# Patient Record
Sex: FEMALE | Race: WHITE | Employment: OTHER | URBAN - METROPOLITAN AREA
[De-identification: names, ages, dates, MRNs, and addresses within clinical notes are randomized per-mention and may not be internally consistent; named-entity substitution may affect disease eponyms.]

---

## 2023-05-17 ENCOUNTER — HOSPITAL ENCOUNTER (EMERGENCY)
Facility: HOSPITAL | Age: 83
Discharge: HOME | End: 2023-05-17
Attending: STUDENT IN AN ORGANIZED HEALTH CARE EDUCATION/TRAINING PROGRAM
Payer: MEDICARE

## 2023-05-17 VITALS
DIASTOLIC BLOOD PRESSURE: 85 MMHG | SYSTOLIC BLOOD PRESSURE: 145 MMHG | RESPIRATION RATE: 20 BRPM | TEMPERATURE: 98.4 F | OXYGEN SATURATION: 97 % | HEART RATE: 87 BPM

## 2023-05-17 DIAGNOSIS — E11.9 NEW ONSET TYPE 2 DIABETES MELLITUS (HCC): Primary | ICD-10-CM

## 2023-05-17 LAB
ALBUMIN SERPL-MCNC: 4 G/DL (ref 3.5–5)
ALBUMIN/GLOB SERPL: 1.1 (ref 1.1–2.2)
ALP SERPL-CCNC: 80 U/L (ref 45–117)
ALT SERPL-CCNC: 36 U/L (ref 12–78)
ANION GAP SERPL CALC-SCNC: 5 MMOL/L (ref 5–15)
AST SERPL-CCNC: 22 U/L (ref 15–37)
BASOPHILS # BLD: 0.1 K/UL (ref 0–0.1)
BASOPHILS NFR BLD: 1 % (ref 0–1)
BILIRUB SERPL-MCNC: 0.6 MG/DL (ref 0.2–1)
BUN SERPL-MCNC: 23 MG/DL (ref 6–20)
BUN/CREAT SERPL: 19 (ref 12–20)
CALCIUM SERPL-MCNC: 9.2 MG/DL (ref 8.5–10.1)
CHLORIDE SERPL-SCNC: 102 MMOL/L (ref 97–108)
CO2 SERPL-SCNC: 25 MMOL/L (ref 21–32)
COMMENT:: NORMAL
CREAT SERPL-MCNC: 1.18 MG/DL (ref 0.55–1.02)
DIFFERENTIAL METHOD BLD: NORMAL
EOSINOPHIL # BLD: 0.3 K/UL (ref 0–0.4)
EOSINOPHIL NFR BLD: 3 % (ref 0–7)
ERYTHROCYTE [DISTWIDTH] IN BLOOD BY AUTOMATED COUNT: 12.2 % (ref 11.5–14.5)
EST. AVERAGE GLUCOSE BLD GHB EST-MCNC: 315 MG/DL
GLOBULIN SER CALC-MCNC: 3.7 G/DL (ref 2–4)
GLUCOSE BLD STRIP.AUTO-MCNC: 289 MG/DL (ref 65–117)
GLUCOSE BLD STRIP.AUTO-MCNC: 311 MG/DL (ref 65–117)
GLUCOSE SERPL-MCNC: 300 MG/DL (ref 65–100)
HBA1C MFR BLD: 12.6 % (ref 4–5.6)
HCT VFR BLD AUTO: 43 % (ref 35–47)
HGB BLD-MCNC: 14 G/DL (ref 11.5–16)
IMM GRANULOCYTES # BLD AUTO: 0 K/UL (ref 0–0.04)
IMM GRANULOCYTES NFR BLD AUTO: 0 % (ref 0–0.5)
LYMPHOCYTES # BLD: 2.5 K/UL (ref 0.8–3.5)
LYMPHOCYTES NFR BLD: 26 % (ref 12–49)
MCH RBC QN AUTO: 32 PG (ref 26–34)
MCHC RBC AUTO-ENTMCNC: 32.6 G/DL (ref 30–36.5)
MCV RBC AUTO: 98.4 FL (ref 80–99)
MONOCYTES # BLD: 1 K/UL (ref 0–1)
MONOCYTES NFR BLD: 10 % (ref 5–13)
NEUTS SEG # BLD: 5.7 K/UL (ref 1.8–8)
NEUTS SEG NFR BLD: 60 % (ref 32–75)
NRBC # BLD: 0 K/UL (ref 0–0.01)
NRBC BLD-RTO: 0 PER 100 WBC
PLATELET # BLD AUTO: 247 K/UL (ref 150–400)
PMV BLD AUTO: 10.6 FL (ref 8.9–12.9)
POTASSIUM SERPL-SCNC: 4.4 MMOL/L (ref 3.5–5.1)
PROT SERPL-MCNC: 7.7 G/DL (ref 6.4–8.2)
RBC # BLD AUTO: 4.37 M/UL (ref 3.8–5.2)
SERVICE CMNT-IMP: ABNORMAL
SERVICE CMNT-IMP: ABNORMAL
SODIUM SERPL-SCNC: 132 MMOL/L (ref 136–145)
SPECIMEN HOLD: NORMAL
WBC # BLD AUTO: 9.6 K/UL (ref 3.6–11)

## 2023-05-17 PROCEDURE — 6370000000 HC RX 637 (ALT 250 FOR IP): Performed by: STUDENT IN AN ORGANIZED HEALTH CARE EDUCATION/TRAINING PROGRAM

## 2023-05-17 PROCEDURE — 83036 HEMOGLOBIN GLYCOSYLATED A1C: CPT

## 2023-05-17 PROCEDURE — 85025 COMPLETE CBC W/AUTO DIFF WBC: CPT

## 2023-05-17 PROCEDURE — 82962 GLUCOSE BLOOD TEST: CPT

## 2023-05-17 PROCEDURE — 80053 COMPREHEN METABOLIC PANEL: CPT

## 2023-05-17 PROCEDURE — 36415 COLL VENOUS BLD VENIPUNCTURE: CPT

## 2023-05-17 RX ORDER — MONTELUKAST SODIUM 10 MG/1
TABLET ORAL
COMMUNITY
Start: 2023-04-19

## 2023-05-17 RX ORDER — BLOOD-GLUCOSE METER
1 KIT MISCELLANEOUS DAILY
Qty: 1 KIT | Refills: 0 | Status: SHIPPED | OUTPATIENT
Start: 2023-05-17

## 2023-05-17 RX ORDER — METOPROLOL SUCCINATE 100 MG/1
TABLET, EXTENDED RELEASE ORAL
COMMUNITY
Start: 2023-03-20

## 2023-05-17 RX ORDER — CLOPIDOGREL BISULFATE 75 MG/1
TABLET ORAL
COMMUNITY
Start: 2023-03-30

## 2023-05-17 RX ORDER — ESCITALOPRAM OXALATE 20 MG/1
TABLET ORAL
COMMUNITY
Start: 2023-04-21

## 2023-05-17 RX ORDER — AMLODIPINE BESYLATE AND BENAZEPRIL HYDROCHLORIDE 10; 40 MG/1; MG/1
CAPSULE ORAL
COMMUNITY
Start: 2023-03-13

## 2023-05-17 RX ORDER — LORAZEPAM 2 MG/1
TABLET ORAL
COMMUNITY
Start: 2023-02-13

## 2023-05-17 RX ORDER — INSULIN GLARGINE 100 [IU]/ML
10 INJECTION, SOLUTION SUBCUTANEOUS ONCE
Status: COMPLETED | OUTPATIENT
Start: 2023-05-17 | End: 2023-05-17

## 2023-05-17 RX ORDER — GABAPENTIN 300 MG/1
CAPSULE ORAL
COMMUNITY
Start: 2023-02-13

## 2023-05-17 RX ORDER — ROSUVASTATIN CALCIUM 10 MG/1
TABLET, COATED ORAL
COMMUNITY
Start: 2023-04-21

## 2023-05-17 RX ADMIN — INSULIN GLARGINE 10 UNITS: 100 INJECTION, SOLUTION SUBCUTANEOUS at 22:45

## 2023-05-17 ASSESSMENT — PAIN - FUNCTIONAL ASSESSMENT: PAIN_FUNCTIONAL_ASSESSMENT: NONE - DENIES PAIN

## 2023-05-17 NOTE — ED TRIAGE NOTES
Pt referred by PCP for hyperglycemia. Pt reports she received a call from provider saying her glucose was in the 500's. Pt denies being diabetic.

## 2023-05-18 NOTE — ED PROVIDER NOTES
normal axis, no acute ST segment or T wave changes. Interpreted independently by me. Laboratory Results: If not already interpreted as below in ED course, I have personally ordered, reviewed, and independently interpreted all laboratory results, notable for:  --  Labs Reviewed   COMPREHENSIVE METABOLIC PANEL - Abnormal; Notable for the following components:       Result Value    Sodium 132 (*)     Glucose 300 (*)     BUN 23 (*)     Creatinine 1.18 (*)     Est, Glom Filt Rate 47 (*)     All other components within normal limits   POCT GLUCOSE - Abnormal; Notable for the following components:    POC Glucose 311 (*)     All other components within normal limits   CBC WITH AUTO DIFFERENTIAL   EXTRA TUBES HOLD   HEMOGLOBIN A1C     Imaging Results: If not already interpreted as below in ED course, I have personally ordered, reviewed, and interpreted the following radiology results:    No orders to display       ED COURSE        PROCEDURES  Procedures    Medications Ordered/Administered in ED  Medications - No data to display    ADDITIONAL CONSIDERATIONS   I opted against but considered ordering the following:  --CT  -- ***  Additional relevant contributory comorbidities managed:  ***  Social Determinants of Health addressed:  ***  FINAL ASSESSMENT AND DISPOSITION     ED DIAGNOSIS  1.  New onset type 2 diabetes mellitus (HCC)        DISPOSITION  ***    Labs/Imaging Studies Ordered/Performed in ED  Orders Placed This Encounter   Procedures    CBC with Auto Differential    Comprehensive Metabolic Panel    Extra Tubes Hold    Hemoglobin A1C    POC GLUCOSE    POCT Glucose       Electronically signed by

## 2023-06-29 ENCOUNTER — NURSE ONLY (OUTPATIENT)
Age: 83
End: 2023-06-29
Payer: MEDICARE

## 2023-06-29 DIAGNOSIS — E11.65 TYPE 2 DIABETES MELLITUS WITH HYPERGLYCEMIA, WITHOUT LONG-TERM CURRENT USE OF INSULIN (HCC): Primary | ICD-10-CM

## 2023-06-29 PROCEDURE — G0108 DIAB MANAGE TRN  PER INDIV: HCPCS

## 2023-07-26 ENCOUNTER — NURSE ONLY (OUTPATIENT)
Age: 83
End: 2023-07-26
Payer: MEDICARE

## 2023-07-26 DIAGNOSIS — E11.65 TYPE 2 DIABETES MELLITUS WITH HYPERGLYCEMIA, WITHOUT LONG-TERM CURRENT USE OF INSULIN (HCC): Primary | ICD-10-CM

## 2023-07-26 PROCEDURE — G0108 DIAB MANAGE TRN  PER INDIV: HCPCS

## 2023-07-26 NOTE — PROGRESS NOTES
[facility] Program for Diabetes Health  Diabetes Self-Management Education & Support Program  Encounter Note      SUMMARY  Diabetes self-care management training was completed related to healthy eating. he participant will return on August 09 to continue DSMES related to monitoring. The participant did identify SMART Goal(s) and will practice knowledge and skills related to healthy eating to improve diabetes self-management. EVALUATION:  Blood sugars= states blood sugars are  mg/dl   Weight= states she can't use the scaled because she looses her balance. Pt with hx of troke. Healthy eating = I taught briefly the CHO counting on previous visit and the patient ,started to count and now. her bs are in better control 98-10 , they used to be 200- 300 mg/dl . Patient able to demonstrate correctly how to count CHO's. Congratulated patient for great effort to control her blood sugars. RECOMMENDATIONS:  - Continue with lifestyle changes     TOPICS DISCUSSED TODAY:  WHAT CAN I EAT? 60      Next provider visit is scheduled: not schedule at this time. SMART GOAL(S)   Will count CHO for each meal 7 days a week. ACHIEVEMENT OF GOAL(S) : 0-24%         DATE DSMES TOPIC EVALUATION     7/26/2023 WHAT CAN I EAT? General principles   Determining a healthy weight   Nutritional terms & tools   Healthy Plate method   Carbohydrate Counting   Reading food labels   Free apps   Pregnancy recommendations      The participant   Uses Healthy Plate principles in constructing meals: Yes  Reads food labels in choosing acceptable foods: Yes    The participant needs to address: will practice CHO counting. Felix Cardoso RN on 7/26/2023 at 2:27 PM    I have personally reviewed the health record, including provider notes, laboratory data and current medications before making these care and education recommendations. The time spent in this effort is included in the total time.   Total minutes: [address]

## 2023-08-09 ENCOUNTER — NURSE ONLY (OUTPATIENT)
Age: 83
End: 2023-08-09

## 2023-08-09 DIAGNOSIS — E11.65 TYPE 2 DIABETES MELLITUS WITH HYPERGLYCEMIA, WITHOUT LONG-TERM CURRENT USE OF INSULIN (HCC): Primary | ICD-10-CM

## 2023-08-09 NOTE — PROGRESS NOTES
[facility] Program for Diabetes Health  Diabetes Self-Management Education & Support Program  Encounter Note      SUMMARY  Diabetes self-care management training was completed related to monitoring. he participant will return on August 24 to continue DSMES related to reducing risks. The participant did identify SMART Goal(s) and will practice knowledge and skills related to reducing risks to improve diabetes self-management. EVALUATION:  Blood sugars= states his bs today 125 mg/dl . States bs in the past couple of weeks were 125, 130 . States learned what Is causing her blood sugars goes up , like stress and working on it . RECOMMENDATIONS:  - continue with lifestyle changes. TOPICS DISCUSSED TODAY:  HOW CAN BLOOD GLUCOSE MONITORING HELP ME? 61      Next provider visit is scheduled: not booked at this time      SMART GOAL(S)   Will purchase control solution and check test strips. ACHIEVEMENT OF GOAL(S) : 0-24%             DATE DSMES TOPIC EVALUATION     8/9/2023 HOW CAN BLOOD GLUCOSE MONITORING HELP ME? Value of blood glucose monitoring   Realistic expectations   Blood glucose monitoring targets   Target adjustments   Setting a1c & blood glucose targets with provider   Meter selection    Technique for obtaining blood droplet   Blood glucose testing sites   Determining best times to test   Pregnancy recommendations   Data sharing with provider        The participant   Can demonstrate their glucometer procedure: Yes  Logs their BG readings:  Yes    The participant needs to address :  check test strips with control solution. Shankar Sr RN on 8/9/2023 at 2:43 PM    I have personally reviewed the health record, including provider notes, laboratory data and current medications before making these care and education recommendations. The time spent in this effort is included in the total time. Total minutes: 60      Louise Gonzales, was evaluated in person at office visit.      Shankar Rosenthal

## 2023-08-24 ENCOUNTER — NURSE ONLY (OUTPATIENT)
Age: 83
End: 2023-08-24
Payer: MEDICARE

## 2023-08-24 DIAGNOSIS — E11.65 TYPE 2 DIABETES MELLITUS WITH HYPERGLYCEMIA, WITHOUT LONG-TERM CURRENT USE OF INSULIN (HCC): Primary | ICD-10-CM

## 2023-08-24 PROCEDURE — G0108 DIAB MANAGE TRN  PER INDIV: HCPCS

## 2023-08-24 NOTE — PROGRESS NOTES
[facility] Program for Diabetes Health  Diabetes Self-Management Education & Support Program  Encounter Note      SUMMARY  Diabetes self-care management training was completed related to reducing risks. he participant will return on September 01 to continue DSMES related to reducing risks. The participant did not identify SMART Goal(s)     EVALUATION:  Blood Sugars= states bs = mg/dl, states bs= 122 mg/dl today   Exercise= doing house work, walks when [employer], doesn't have a routine for exercise. RECOMMENDATIONS:  - continue with lifestyle changes. TOPICS DISCUSSED TODAY:  WHAT IS DIABETES? Minutes: 60      Next provider visit is scheduled = not booked at this time. SMART GOAL(S)   Patient doesn't have a smart goal at this time. DATE DSMES TOPIC EVALUATION     8/24/2023 WHAT IS DIABETES? Role of the normal pancreas in energy balance and blood glucose control   The defect seen in diabetes   Signs & symptoms of diabetes   Diagnosis of diabetes   Types of diabetes   Blood glucose targets in non-pregnant & non-pregnant adults       The participant knows  Their type of diabetes: Yes   The basic physiologic defect: Yes  Blood glucose targets: Yes       Michael Johnston RN on 8/24/2023 at 2:40 PM    I have personally reviewed the health record, including provider notes, laboratory data and current medications before making these care and education recommendations. The time spent in this effort is included in the total time. Total minutes: 60    Liset Encarnacion, was evaluated in person at office visit.      Michael Johnston RN [address] [facility]  Certified Diabetes and    [facility] for Diabetes [facility]   Tel  [phone]

## 2023-09-01 ENCOUNTER — NURSE ONLY (OUTPATIENT)
Age: 83
End: 2023-09-01

## 2023-09-01 DIAGNOSIS — E11.65 TYPE 2 DIABETES MELLITUS WITH HYPERGLYCEMIA, WITHOUT LONG-TERM CURRENT USE OF INSULIN (HCC): Primary | ICD-10-CM

## 2023-09-01 NOTE — PROGRESS NOTES
[facility] Program for Diabetes Health  Diabetes Self-Management Education & Support Program  Encounter Note      SUMMARY  Diabetes self-care management training was completed related to reducing risks. he participant will return on September 15 to continue DSMES related to taking medications. The participant did not identify SMART Goal(s)     EVALUATION:  - Blood sugars = 98- 137 mg/dl. - vaccines: patients states she doesn't take the flu vaccine , or pneumonia vaccines  - Eye exam - states had exam on 8/22   - she check her blood pressure at home , using the wrist meter , states her pcp advised not to use that one . States can't use regular blood pressure cuff by herself, hx of stroke. I show her  a picture of a BP cuff that just need to place her arm in , no need to wrap around the arm. States have a neighbor who is an MD and she will help her get one.  - Stress: states she takes medication for stress, \"atarax\"   She also do deep breathing for stress. RECOMMENDATIONS:  - continue with lifestyle changes. TOPICS DISCUSSED TODAY:  HOW DO I STAY HEALTHY? 61      Next provider visit is scheduled : not schedule at this time       SMART GOAL(S)   Patient doesn't have a smart goal at this time. DATE DSMES TOPIC EVALUATION     9/1/2023 HOW DO I STAY HEALTHY? Prevention   Vaccinations   Preconception care (if applicable)  Examinations   Eye    Foot   Diabetic complications' prevention   Dental health   Heart health   Kidney Health   Nerve health   Sleep health      The participant has a personal diabetes care record to keep abreast of diabetes health Yes     The participant needs to address : fill the diabetes care record. Cailin Herrmann RN on 9/1/2023 at 2:29 PM    I have personally reviewed the health record, including provider notes, laboratory data and current medications before making these care and education recommendations.  The time spent in this effort is included in the total

## 2023-09-15 ENCOUNTER — OFFICE VISIT (OUTPATIENT)
Age: 83
End: 2023-09-15

## 2023-09-15 DIAGNOSIS — E11.65 TYPE 2 DIABETES MELLITUS WITH HYPERGLYCEMIA, WITHOUT LONG-TERM CURRENT USE OF INSULIN (HCC): Primary | ICD-10-CM

## 2023-09-15 NOTE — PROGRESS NOTES
[employer] [facility] Program for Diabetes Health  Diabetes Self-Management Education & Support Program  Encounter Note      SUMMARY  Diabetes self-care management training was completed related to taking medications. he participant will return on September 25 to continue DSMES related to taking medications. The participant did not identify SMART Goal(s) . EVALUATION:  - blood sugars = today the bs was 117 mg/dl  - states have an issue and came late today. I LMTRC , informed to disregard. RECOMMENDATIONS:  - continue with lifestyle changes. TOPICS DISCUSSED TODAY:  HOW DO MY DIABETES MEDICATIONS WORK? 30      Next provider visit is scheduled: not schedule        SMART GOAL(S)   Patient don't have smart goal at this time. DATE DSMES TOPIC EVALUATION     9/15/2023 HOW DO MY DIABETES MEDICATIONS WORK? Type 2 medications   Oral agents   GLP-1 agonists      The participant   Can describe the expected action & side effects of prescribed diabetes medications: Yes      The participant needs to address : report any adverse effects. Fabio Barragan RN on 9/15/2023 at 1:36 PM    I have personally reviewed the health record, including provider notes, laboratory data and current medications before making these care and education recommendations. The time spent in this effort is included in the total time. Total minutes: 30    Patient was late 30 minutes . Hx stroke      Faheber Salinas, was evaluated at office visit.      Fabio Barragan RN [address] [facility]  Certified Diabetes and    [facility] Program for Diabetes Health   Tel  [phone]

## 2023-09-25 ENCOUNTER — OFFICE VISIT (OUTPATIENT)
Age: 83
End: 2023-09-25
Payer: MEDICARE

## 2023-09-25 DIAGNOSIS — E11.65 TYPE 2 DIABETES MELLITUS WITH HYPERGLYCEMIA, WITHOUT LONG-TERM CURRENT USE OF INSULIN (HCC): Primary | ICD-10-CM

## 2023-09-25 PROCEDURE — G0108 DIAB MANAGE TRN  PER INDIV: HCPCS

## 2023-09-25 NOTE — PROGRESS NOTES
making these care and education recommendations. The time spent in this effort is included in the total time. Total minutes: 30      Juan Marques, was evaluated in person at office visit.      Natalie Vazquez RN [address] [facility]  Certified Diabetes and    [facility] Program for Diabetes Health   Tel  [phone]

## 2023-11-16 ENCOUNTER — TELEPHONE (OUTPATIENT)
Age: 83
End: 2023-11-16

## 2023-11-16 NOTE — TELEPHONE ENCOUNTER
[address] [facility]  TELEPHONE CALL   11/16/23  1:20 PM    I called patient Ricky Sheppard , today . Notice patient cancelled several appointments . States she have shingles and she it's been unable to come to the appointments. She wants to re-schedule for Jan- 2024 . Appt re-scheduled for 1/3/24. sridevi Salcido RN [address] [facility]  Certified Diabetes and    [facility] Program for Diabetes Health   Tel  [phone]      Note routed to PCP and Endocrinologist , for FYI   Copy of message sent     [facility]     Patient will resume , DM classes in [place] 3, 2024. States she have shingles.       Thank you,   Luna Dockery RN [address] [facility]  Certified Diabetes and    [facility] Program for Diabetes Health \"

## 2024-01-03 ENCOUNTER — OFFICE VISIT (OUTPATIENT)
Age: 84
End: 2024-01-03
Payer: MEDICARE

## 2024-01-03 DIAGNOSIS — E11.65 TYPE 2 DIABETES MELLITUS WITH HYPERGLYCEMIA, WITHOUT LONG-TERM CURRENT USE OF INSULIN (HCC): Primary | ICD-10-CM

## 2024-01-03 PROCEDURE — G0108 DIAB MANAGE TRN  PER INDIV: HCPCS

## 2024-01-03 NOTE — PROGRESS NOTES
[facility] for Diabetes Health  Diabetes Self-Management Education & Support Program  Encounter Note      SUMMARY  Diabetes self-care management training was completed related to physical activity. he participant will return on January 11 to continue DSMES related to healthy coping. The participant did identify SMART Goal(s) and will practice knowledge and skills related to being active to improve diabetes self-management.        EVALUATION:  Blood Sugars= states today bs = 91 mg/dl . States Saturday her bs was 190 mg/dl , states she had high stress , lost her dog .    States she its been having issues during the holidays; was expose to covid with a relative but was not positive for covid; also lost her dog; prior had shingles.     Exercise= states she walk 5 mnts every day and she is very active at home, doing house work.   Advised to count her steps , educated on device and gave her handout that can covert steps in minutes.   States will consult with provider or relative that is a doctor on Physical therapy on what exercises can do. Advised to consult with her provider prior any changes on exercise. Pt may benefit from PT to teach her again exercise she can do at home. Patient states they didn't gave her exercises that she can do at home.   States lost 20 lbs. States she is counting CHO's. Pt also taking mounjaro.     RECOMMENDATIONS:  - Continue with lifestyle changes.      TOPICS DISCUSSED TODAY:  HOW DOES PHYSICAL ACTIVITY AFFECT MY DIABETES? 60      Next provider visit is scheduled : not booked at this time       SMART GOAL(S)   States will asked her provider , or a relative that is a doctor on Physical therapy , to help her with exercises that she can do.  Advised to consult with provider for any changes in exercise.   ACHIEVEMENT OF GOAL(S) : 0-24%         DATE DSMES TOPIC EVALUATION     1/3/2024 HOW DOES PHYSICAL ACTIVITY AFFECT MY DIABETES?   Benefits of physical activity   Beginning a program of

## 2024-01-11 ENCOUNTER — OFFICE VISIT (OUTPATIENT)
Age: 84
End: 2024-01-11

## 2024-01-11 DIAGNOSIS — E11.65 TYPE 2 DIABETES MELLITUS WITH HYPERGLYCEMIA, WITHOUT LONG-TERM CURRENT USE OF INSULIN (HCC): Primary | ICD-10-CM

## 2024-01-11 NOTE — PROGRESS NOTES
[facility] for Diabetes Health  Diabetes Self-Management Education & Support Program  Encounter Note      SUMMARY  Diabetes self-care management training was completed related to healthy coping. he participant will return on January 18 to continue DSMES related to problem solving. The participant did identify SMART Goal(s) and will practice knowledge and skills related to healthy eating and monitoring to improve diabetes self-management.        EVALUATION:  Blood sugars= states she had a bs today of 85 mg/dl , and yesterday bs of 85 mg/dl.  States when she had the 85 mg/dl, she felt light headache when she got up.   Other bs #'s= 115 mg/dl, 116 mg/dl, 106 mg/dl.   States her dog pass away and she was very stress out about him , he was sick. She got up at night several times. Now she is sleeping all night , she feels rest and less stress. Think this is the reason why her bs are even lower than usual.     Healthy eating= states since she started the classes she is eating 3 meals a day. She is also eating 3 apples per day.  Haven't purchase her apples this week. Usually eats \"pink apples\"    Stress = states is lower since she doesn't have her worry about her dog. Planning to get a new dog. Pt very happy about it. States she do deep breathing . She used to play the piano. Some limitations walking due to her hx of stroke.     Exercise= states she do house work, not walking on regular basis , due to weather. She try to be active.   States lost 22 lbs since November. Patient on mounjaro.     RECOMMENDATIONS:  - continue with lifestyle changes  - notify her PCP about her bs on the 80's in am   - will eat her usual snacks    TOPICS DISCUSSED TODAY:  HOW DO I FIND SUPPORT TO TACKLE THIS CONDITION? 60      Next provider visit is scheduled : not scheduled at this time per chart.        SMART GOAL(S)   Will contact provider to report her bs   ACHIEVEMENT OF GOAL(S) : 0-24%    2.    Will resume her usual

## 2024-01-18 ENCOUNTER — OFFICE VISIT (OUTPATIENT)
Age: 84
End: 2024-01-18

## 2024-01-18 DIAGNOSIS — E11.65 TYPE 2 DIABETES MELLITUS WITH HYPERGLYCEMIA, WITHOUT LONG-TERM CURRENT USE OF INSULIN (HCC): Primary | ICD-10-CM

## 2024-01-18 NOTE — PROGRESS NOTES
[facility] Diabetes [facility]  Diabetes Self-Management Education & Support Program  Encounter Note      SUMMARY  Diabetes self-care management training was completed related to problem solving. he participant will return on February 29 for the 6 weeks follow up  The participant did identify SMART Goal(s) and will practice knowledge and skills related to problem solving to improve diabetes self-management.        EVALUATION:  Blood sugars= states her bs this morning was 77 mg/dl , then she re-check in 5 minutes and her bs was 117 mg/dl. States her bs last night was 120 mg/dl . Denies any sx . States she changed the battery of her meter recently. Have a True Metrix meter. She got her meter on May 2023 .   Was using only one finger to check her blood sugars - reviewed again how to check blood sugars. Will use other fingers.   On last visit bs in am was on the 80's , she states she called and notified her provider and told her that was ok . No changes on medication. Advised to call again since blood sugars on the 70's , she will also check her test strips with control solution.     Stress= levels of stress down , since her dog \"peppa\" pass away. She used get up at night frequently since he was sick,but now she is sleeping better.     Healthy eating = states she think at times may not eating enough.  Breakfast= cheerios and water  Lunch= Bread - 2 pcs ; beef , zucchini, spinach, onions, apple   Dinner= chicken , broccoli, fettuccini nancie, water   Snack= apple     Meter= will check test strips with control solution. Reviewed how to check test strips with control solution.     RECOMMENDATIONS:  - continue with lifestyle changes  - report to her provider if her blood sugars are on the 70's or 80's again. Patient with good feedback.      TOPICS DISCUSSED TODAY:  HOW DO I FIGURE OUT WHAT'S INFLUENCING MY BLOOD GLUCOSES? 60      Next provider visit is scheduled : not book at this time per chart        SMART

## 2024-01-19 ENCOUNTER — TELEPHONE (OUTPATIENT)
Age: 84
End: 2024-01-19

## 2024-01-19 NOTE — TELEPHONE ENCOUNTER
PROGRAM FOR DIABETES HEALTH  TELEPHONE CALL   01/19/24  4:55 PM    I called patient Cici Thorne , today 01/19/24, to follow up with her , however not accepting calls. I called twice today    Will try to call Monday again.   Unable to contact.    02/15/24  3:08 PM    Patient states she did contact her provider. He gave her parameters for he blood sugars. States her bs are 112-116 mg/dl  in am . Blood sugar today in AM= 120 mg/dl. States she think bs was going lower on the 80's due to the trauma of losing her dog. Patient just got a new puppy and states feels great.      Advised to call PRN   Patient have an appt with me on 2/29/24.       Daily Sal[facility]ido [facility]  Certified Diabetes and    [facility] for Diabetes Health   Tel  [phone]

## 2024-04-01 ENCOUNTER — OFFICE VISIT (OUTPATIENT)
Age: 84
End: 2024-04-01
Payer: MEDICARE

## 2024-04-01 DIAGNOSIS — E11.65 TYPE 2 DIABETES MELLITUS WITH HYPERGLYCEMIA, WITHOUT LONG-TERM CURRENT USE OF INSULIN (HCC): Primary | ICD-10-CM

## 2024-04-01 PROCEDURE — G0108 DIAB MANAGE TRN  PER INDIV: HCPCS

## 2024-04-01 NOTE — PROGRESS NOTES
[facility] for Diabetes Health  Diabetes Self-Management Education & Support Program  Post-program Evaluation    EVALUATION @ COMPLETION OF THE PROGRAM    Cici Thorne completed 8 hours of diabetes self-management education. The participant acquired new knowledge and demonstrated new skills during the program.     The participant worked on the following SMART GOAL(s) to improve their diabetes self-management:  Previous goals:   Will buy glucose tablets to carry with her and have in the house, car.   ACHIEVEMENT OF GOAL(S) : 100%     2.    Will call her provider to notifed the bs of 77 mg/dl. And also if she gets bs of 70's, 80's.   ACHIEVEMENT OF GOAL(S) :  100 %   states told her provider and was told to keep bs 100-150 mg/dl   Blood sugars are better , states bs on 70's once, 80's 2x , 90's 1x, other bs numbers 100's.      The participant's pre-program A1c was   Lab Results   Component Value Date/Time    LABA1C 12.6 05/17/2023 06:13 PM   . The post-evaluation A1c : not done at this time.    The participant improved their Diabetes Skills, Confidence and Preparedness Index (scored out of 7):    Total score: 6.5  Skills: 6.4  Confidence: 4.7  Preparedness: 5.5    NOTE:   - patient states she learned a lot  on how to manage her diabetes.  States since following the plate method blood sugars had a great improvement.   - blood sugars = reports today bs = 114 mg/dl today.   - states getting her new puppy, is a great stress relief.   - states with her previous dog , she had to get up at night to take care of him and she was suffering from lack of sleep. States she is sleeping better now.     FINAL RECOMMENDATIONS:  - continue with lifestyle changes.      Next provider visit is scheduled = not booked a this time.        Daily Salcido RN on 4/1/2024     Metric Patient's responses (4/1/2024) Areas for improvement     Healthy Eating       The participant is using the Healthy Plate to control carbohydrate intake -